# Patient Record
Sex: FEMALE | Race: WHITE
[De-identification: names, ages, dates, MRNs, and addresses within clinical notes are randomized per-mention and may not be internally consistent; named-entity substitution may affect disease eponyms.]

---

## 2019-03-03 ENCOUNTER — HOSPITAL ENCOUNTER (EMERGENCY)
Dept: HOSPITAL 74 - JER | Age: 54
Discharge: HOME | End: 2019-03-03
Payer: COMMERCIAL

## 2019-03-03 VITALS — DIASTOLIC BLOOD PRESSURE: 71 MMHG | SYSTOLIC BLOOD PRESSURE: 111 MMHG | HEART RATE: 95 BPM

## 2019-03-03 VITALS — TEMPERATURE: 100 F

## 2019-03-03 VITALS — BODY MASS INDEX: 42.9 KG/M2

## 2019-03-03 DIAGNOSIS — N75.1: Primary | ICD-10-CM

## 2019-03-03 DIAGNOSIS — Z88.8: ICD-10-CM

## 2019-03-03 DIAGNOSIS — E11.9: ICD-10-CM

## 2019-03-03 DIAGNOSIS — Z79.84: ICD-10-CM

## 2019-03-03 LAB
ALBUMIN SERPL-MCNC: 4 G/DL (ref 3.4–5)
ALP SERPL-CCNC: 85 U/L (ref 45–117)
ALT SERPL-CCNC: 33 U/L (ref 13–61)
ANION GAP SERPL CALC-SCNC: 8 MMOL/L (ref 8–16)
AST SERPL-CCNC: 15 U/L (ref 15–37)
BASOPHILS # BLD: 0.5 % (ref 0–2)
BILIRUB SERPL-MCNC: 0.8 MG/DL (ref 0.2–1)
BUN SERPL-MCNC: 18 MG/DL (ref 7–18)
CALCIUM SERPL-MCNC: 8.8 MG/DL (ref 8.5–10.1)
CHLORIDE SERPL-SCNC: 105 MMOL/L (ref 98–107)
CO2 SERPL-SCNC: 25 MMOL/L (ref 21–32)
CREAT SERPL-MCNC: 0.8 MG/DL (ref 0.55–1.3)
DEPRECATED RDW RBC AUTO: 13.5 % (ref 11.6–15.6)
EOSINOPHIL # BLD: 0.3 % (ref 0–4.5)
GLUCOSE SERPL-MCNC: 142 MG/DL (ref 74–106)
HCT VFR BLD CALC: 44.3 % (ref 32.4–45.2)
HGB BLD-MCNC: 15.2 GM/DL (ref 10.7–15.3)
LYMPHOCYTES # BLD: 6.4 % (ref 8–40)
MCH RBC QN AUTO: 31.1 PG (ref 25.7–33.7)
MCHC RBC AUTO-ENTMCNC: 34.3 G/DL (ref 32–36)
MCV RBC: 90.5 FL (ref 80–96)
MONOCYTES # BLD AUTO: 6.2 % (ref 3.8–10.2)
NEUTROPHILS # BLD: 86.6 % (ref 42.8–82.8)
PLATELET # BLD AUTO: 146 K/MM3 (ref 134–434)
PMV BLD: 9.6 FL (ref 7.5–11.1)
POTASSIUM SERPLBLD-SCNC: 4.2 MMOL/L (ref 3.5–5.1)
PROT SERPL-MCNC: 7.4 G/DL (ref 6.4–8.2)
RBC # BLD AUTO: 4.89 M/MM3 (ref 3.6–5.2)
SODIUM SERPL-SCNC: 138 MMOL/L (ref 136–145)
WBC # BLD AUTO: 14.1 K/MM3 (ref 4–10)

## 2019-03-03 PROCEDURE — 0U9L0ZZ DRAINAGE OF VESTIBULAR GLAND, OPEN APPROACH: ICD-10-PCS | Performed by: STUDENT IN AN ORGANIZED HEALTH CARE EDUCATION/TRAINING PROGRAM

## 2019-03-03 NOTE — PDOC
Attending Attestation





- Resident


Resident Name: Domingo Bernstein





- ED Attending Attestation


I have performed the following: I have examined & evaluated the patient, The 

case was reviewed & discussed with the resident, I agree w/resident's findings 

& plan, Exceptions are as noted





- HPI


HPI: 





03/03/19 08:38


54y F hx of DM presents with abcess. Pt notes she had a pea sized abscess 

yesterday morning thats had been gradually getting larger this morning the 

patient's notes that the abscess was "huge", and has been more painful. He 

endorses subjective fever she did take a Tylenol last night at approximately 

midnight.  The patient notes she has had several episodes of abscesses before 

however not in this area. 





Surgical history:hysterectomy


Family history: reviewed and noncontributory


Social: denies smoking, etoh abuse























A&P - suspect Bartholin cyst, will IND. 








- Physicial Exam


PE: 





03/03/19 10:41


vitals reviewed - noted for low grade fevoer and tachyardia of 124


general: no acute distress, well appearing


Abd: soft nontender, no rebound/guarding


Gyn: erythema, induration on posterior R vulva with approx 4x2cm mass within 

vulva











- Medical Decision Making





03/03/19 10:42


attempted ID without any discharge


ID performed by resident under my supervision


likey cellulitis


will ck basic labs


will give abx


anticipate dc with gyn fu





03/03/19 11:08


pts lqabs noted for mild leukocytosis


liana lstart the pt w/ abx for vuvlvar celluliits with gyn fu


return precautios were discussed

## 2019-03-03 NOTE — PDOC
History of Present Illness





- General


Chief Complaint: Abscess Boil


Stated Complaint: ABSCESS


Time Seen by Provider: 03/03/19 07:42





- History of Present Illness


Initial Comments: 





03/03/19 08:16


54F with pmh of DM2 presetns to the ed with abcess to the right labia since 

yesterday. Started in the morning as a pea size progressing to the size of an 

egg today. She's used to getting abscess but usually on the thighs never on her 

labia. 


She endorses some chills but no fever. Took Tylenol around midnight last night. 





Allergy: anaphylaxis in response to cefoxitin. 





Past History





- Past Medical History


Allergies/Adverse Reactions: 


 Allergies











Allergy/AdvReac Type Severity Reaction Status Date / Time


 


cefoxitin Allergy   Verified 03/03/19 07:34


 


diphenhydramine Allergy   Verified 03/03/19 07:35





[From Benadryl Allergy]     











Home Medications: 


Ambulatory Orders





Dapagliflozin Propanediol [Farxiga] 5 mg PO DAILY 03/03/19 


Metformin HCl [Glucophage] 1,000 mg PO BID 03/03/19 


Semaglutide [Ozempic] 0.5 mg SQ ASDIR 03/03/19 


Sulfamethoxazole/Trimethoprim [Bactrim Ds -] 1 tab PO BID #14 tablet 03/03/19 








COPD: No


Diabetes: Yes





- Surgical History


Abdominal Surgery: Yes





- Suicide/Smoking/Psychosocial Hx


Smoking History: Never smoked





**Review of Systems





- Review of Systems


Able to Perform ROS?: Yes


Is the patient limited English proficient: No


Constitutional: Yes: Chills, Fever


HEENTM: No: Symptoms Reported


Respiratory: No: Symptoms reported


Cardiac (ROS): No: Symptoms Reported


ABD/GI: No: Symptoms Reported


: No: Symptoms Reported


Musculoskeletal: No: Symptoms Reported


Integumentary: No: Symptoms Reported


Neurological: No: Symptoms reported


All Other Systems: Reviewed and Negative





*Physical Exam





- Vital Signs


 Last Vital Signs











Temp Pulse Resp BP Pulse Ox


 


 100.0 F H  124 H  20   146/86   98 


 


 03/03/19 07:32  03/03/19 07:32  03/03/19 07:32  03/03/19 07:32  03/03/19 07:32














- Physical Exam


General Appearance: Yes: Nourished, Appropriately Dressed


HEENT: positive: EOMI, CHARLENE, Normal ENT Inspection


Respiratory/Chest: positive: Lungs Clear, Normal Breath Sounds.  negative: 

Chest Tender, Respiratory Distress


Cardiovascular: positive: Regular Rhythm, S1, S2, Tachycardia


Female Pelvic Exam: positive: other (swollen tender right labia majora).  

negative: normal external exam


Gastrointestinal/Abdominal: positive: Normal Bowel Sounds, Flat, Soft.  negative

: Tender


Integumentary: positive: Normal Color, Dry, Warm





Moderate Sedation





- Procedure Monitoring


Vital Signs: 


Procedure Monitoring Vital Signs











Temperature  100.0 F H  03/03/19 07:32


 


Pulse Rate  124 H  03/03/19 07:32


 


Respiratory Rate  20   03/03/19 07:32


 


Blood Pressure  146/86   03/03/19 07:32


 


O2 Sat by Pulse Oximetry (%)  98   03/03/19 07:32











Procedures





- Incision and Drainage


I&D Site: Right: Groin


Betadine cleansed: Yes


Anesthesia: 2% Lidocaine


Blade Size: 11


Attempts: 2


Complications: none





ED Treatment Course





- LABORATORY


CBC & Chemistry Diagram: 


 03/03/19 10:13





 03/03/19 10:13





Medical Decision Making





- Medical Decision Making





03/03/19 10:41


54f with right bartholin abscess/infection. 





No drainage during I&D. 


03/03/19 10:56


elevated wbc. Will treat with bactrim and follow up with obgyn. 





*DC/Admit/Observation/Transfer


Diagnosis at time of Disposition: 


 Vulval cellulitis








- Discharge Dispostion


Disposition: HOME


Condition at time of disposition: Improved


Decision to Admit order: No





- Prescriptions


Prescriptions: 


Sulfamethoxazole/Trimethoprim [Bactrim Ds -] 1 tab PO BID #14 tablet





- Referrals


Referrals: 


Jose Giron [Primary Care Provider] - 


Bill Austin MD [Staff Physician] - 





- Patient Instructions


Printed Discharge Instructions:  Vulvar Abscess


Additional Instructions: 


Follow up with Dr. Austin within the next 2-3 days. 


 your antibiotic at the pharmacy and take as prescribed. 


Come back to the emergency department for any new, worsening or concerning 

symptom. 





- Post Discharge Activity

## 2019-08-04 ENCOUNTER — HOSPITAL ENCOUNTER (EMERGENCY)
Dept: HOSPITAL 74 - JERFT | Age: 54
Discharge: HOME | End: 2019-08-04
Payer: COMMERCIAL

## 2019-08-04 VITALS — DIASTOLIC BLOOD PRESSURE: 78 MMHG | TEMPERATURE: 97 F | SYSTOLIC BLOOD PRESSURE: 138 MMHG | HEART RATE: 69 BPM

## 2019-08-04 VITALS — BODY MASS INDEX: 41.1 KG/M2
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
## 2019-08-04 DIAGNOSIS — E11.9: ICD-10-CM
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
## 2019-08-04 DIAGNOSIS — S01.112A: Primary | ICD-10-CM
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
## 2019-08-04 DIAGNOSIS — Z79.84: ICD-10-CM

## 2019-08-04 PROCEDURE — 3E0234Z INTRODUCTION OF SERUM, TOXOID AND VACCINE INTO MUSCLE, PERCUTANEOUS APPROACH: ICD-10-PCS

## 2019-08-04 PROCEDURE — 0HQ1XZZ REPAIR FACE SKIN, EXTERNAL APPROACH: ICD-10-PCS

## 2019-08-04 NOTE — PDOC
History of Present Illness





- General


Chief Complaint: Laceration


Stated Complaint: HEAD INJURY


Time Seen by Provider: 08/04/19 08:07


History Source: Patient


Exam Limitations: No Limitations





Past History





- Travel


Traveled outside of the country in the last 30 days: No


Close contact w/someone who was outside of country & ill: No





- Past Medical History


Allergies/Adverse Reactions: 


 Allergies











Allergy/AdvReac Type Severity Reaction Status Date / Time


 


cefoxitin Allergy   Verified 08/04/19 08:01


 


diphenhydramine Allergy   Verified 08/04/19 08:01





[From Benadryl Allergy]     











Home Medications: 


Ambulatory Orders





Dapagliflozin Propanediol [Farxiga] 5 mg PO DAILY 03/03/19 


Metformin HCl [Glucophage] 1,000 mg PO BID 03/03/19 


Semaglutide [Ozempic] 0.5 mg SQ ASDIR 03/03/19 








COPD: No


Diabetes: Yes (NIDDM)





- Surgical History


Abdominal Surgery: Yes





- Immunization History


Immunization Up to Date: No





- Suicide/Smoking/Psychosocial Hx


Smoking History: Never smoked





**Review of Systems





- Review of Systems


Able to Perform ROS?: Yes


Comments:: 





08/04/19 08:09


CONSTITUTIONAL: 


Absent: fever, chills, diaphoresis, generalized weakness, malaise, loss of 

appetite


HEENT: 


Absent: rhinorrhea, nasal congestion, throat pain, throat swelling, difficulty 

swallowing, mouth swelling, ear pain, eye pain, visual Changes


CARDIOVASCULAR: 


Absent: chest pain, loss of consciousness, palpitations, irregular heart rate, 

peripheral edema


SKIN: 


Present: laceration Absent: rash, itching, pallor


HEMATOLOGIC/IMMUNOLOGIC: 


Absent: easy bleeding, easy bruising, lymphadenopathy, frequent infections


NEUROLOGIC: 


Absent: headache, focal weakness or paresthesias, dizziness, unsteady gait, 

seizure, mental status changes, bladder or bowel incontinence


PSYCHIATRIC: 


Absent: anxiety, depression, suicidal or homicidal ideation, hallucinations.





Is the patient limited English proficient: No





*Physical Exam





- Vital Signs


 Last Vital Signs











Temp Pulse Resp BP Pulse Ox


 


 97 F L  69   18   138/78   99 


 


 08/04/19 07:59  08/04/19 07:59  08/04/19 07:59  08/04/19 07:59  08/04/19 07:59














- Physical Exam


Comments: 





08/04/19 08:09


GENERAL: The patient is awake, alert, and fully oriented, in no acute distress.


HEAD: Normal with no signs of trauma.


EYES: Pupils equal, round and reactive to light, extraocular movements intact, 

sclera anicteric, conjunctiva clear.


EXTREMITIES: Normal range of motion, no edema.


NEUROLOGICAL: Normal speech, normal gait.


PSYCH: Normal mood, normal affect.


SKIN: 1cm vertical linear superficial laceration to the L eyebrow. Warm, Dry, 

normal turgor, no rashes or lesions noted.








Procedures





- Laceration/Wound Repair


  ** Left Medial Face


Wound Length: to 2.5 cm


Wound Explored: clean, no foreign body present


Wound's Depth, Shape: superficial, linear


Irrigated w/ Saline: Yes


Betadine Prep: Yes


Anesthesia: 1% Lidocaine


Amount of Anesthetic (ccs): 3


Wound Repaired With: Sutures


Suture Size/Type: 6:0


Number of Sutures: 2 (simple interrupted)


Layer Closure: No


Sterile Dressing Applied: Yes





Medical Decision Making





- Medical Decision Making





08/04/19 09:03


The patient is a 54-year-old female with past medical history of non-insulin 

dependent diabetes, who presents to the ER today with a laceration to her left 

eyebrow.  She states that she was trying to  her cat when she 

accidentally stood up and hit the corner eyebrow on the edge of the nightstand.

  She does not move her the date of her last tetanus shot.  She did not lose 

consciousness at the time of the incident.  Bleeding is currently controlled.  

Denies fevers, chills, lightheadedness, dizziness bleeding problems.





A/P: Laceration


On exam patient with a 1 cm vertical linear laceration to the left medial 

eyebrow.


2 simple interrupted sutures placed.  See procedure note


Wound was cleaned under high pressure normal saline


We will discharge home with wound care instructions


Patient understands she is to return in 5-7 days to have the stitches removed.


I discussed the physical exam findings, ancillary test results and final 

diagnoses with the patient. I answered all of the patient's questions. The 

patient was satisfied with the care received and felt comfortable with the 

discharge plan and treatment plan.  The Patient agrees to follow up with the 

primary care physician/specialist within 24-72 hours. Return precautions were 

given.





*DC/Admit/Observation/Transfer


Diagnosis at time of Disposition: 


 Laceration








- Discharge Dispostion


Disposition: HOME


Condition at time of disposition: Stable


Decision to Admit order: No





- Referrals


Referrals: 


Saman Aguilar MD [Staff Physician] - 





- Patient Instructions


Printed Discharge Instructions:  DI for Laceration Repair


Additional Instructions: 


You had your cut fixed today with stitches. Keep the area dry for the next 24 

hours.


Please return in 5-7 days to have your stitches removed.


Your tetanus shot was updated today.


Avoid soaking the face. Keep it dry when showering.


Please keep the area clean and pat dry. 


You may use a thin layer of bacitracin once a day.


You may take Tylenol or Motrin as needed for pain. Follow the dosing 

instructions on the bottle





Return to the emergency department sooner if you have area of redness around 

the site, purulent drainage, fevers, or have any changes in your symptoms.





- Post Discharge Activity


Forms/Work/School Notes:  Back to Work

## 2022-11-16 ENCOUNTER — HOSPITAL ENCOUNTER (EMERGENCY)
Dept: HOSPITAL 74 - JER | Age: 57
Discharge: HOME | End: 2022-11-16
Payer: SELF-PAY

## 2022-11-16 VITALS
SYSTOLIC BLOOD PRESSURE: 144 MMHG | RESPIRATION RATE: 17 BRPM | HEART RATE: 81 BPM | TEMPERATURE: 98.2 F | DIASTOLIC BLOOD PRESSURE: 85 MMHG

## 2022-11-16 VITALS — BODY MASS INDEX: 39.8 KG/M2

## 2022-11-16 DIAGNOSIS — S80.911A: Primary | ICD-10-CM
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
